# Patient Record
Sex: MALE | Race: WHITE
[De-identification: names, ages, dates, MRNs, and addresses within clinical notes are randomized per-mention and may not be internally consistent; named-entity substitution may affect disease eponyms.]

---

## 2018-01-31 ENCOUNTER — HOSPITAL ENCOUNTER (EMERGENCY)
Dept: HOSPITAL 31 - C.ER | Age: 43
Discharge: HOME | End: 2018-01-31
Payer: SELF-PAY

## 2018-01-31 VITALS
TEMPERATURE: 97.8 F | OXYGEN SATURATION: 98 % | HEART RATE: 71 BPM | DIASTOLIC BLOOD PRESSURE: 76 MMHG | SYSTOLIC BLOOD PRESSURE: 127 MMHG | RESPIRATION RATE: 20 BRPM

## 2018-01-31 DIAGNOSIS — R20.2: Primary | ICD-10-CM

## 2018-01-31 NOTE — C.PDOC
History Of Present Illness


41 y/o male presents to the ER complaining of a strange, burning sensation in 

his arms and legs which has been present for 3 weeks. Patient denies having 

weakness, parasthesia, and other complaints.Of note, patient works as a 

. He does high intensity exercises and works out 7 times a week. He 

takes multiple supplements which are not prescribed and contain hormones and 

stimulating precursors.


Time Seen by Provider: 01/31/18 10:27


Chief Complaint (Nursing): Upper Extremity Problem/Injury


History Per: Patient


History/Exam Limitations: no limitations


Onset/Duration Of Symptoms: Days


Current Symptoms Are (Timing): Still Present


Severity: Moderate





Past Medical History


Reviewed: Historical Data, Nursing Documentation, Vital Signs


Vital Signs: 


 Last Vital Signs











Temp  97.8 F   01/31/18 09:33


 


Pulse  71   01/31/18 09:33


 


Resp  20   01/31/18 09:33


 


BP  127/76   01/31/18 09:33


 


Pulse Ox  98   01/31/18 11:07














- Medical History


PMH: No Chronic Diseases


Surgical History: No Surg Hx


Family History: States: No Known Family Hx





- Social History


Hx Tobacco Use: No


Hx Alcohol Use: No


Hx Substance Use: No





- Immunization History


Hx Tetanus Toxoid Vaccination: No


Hx Influenza Vaccination: No


Hx Pneumococcal Vaccination: No





Review Of Systems


Neurological: Positive for: Other (burning sensation in arms and legs).  

Negative for: Weakness





Physical Exam





- Physical Exam


Appears: Non-toxic, No Acute Distress


Skin: Normal Color, Warm


Head: Atraumatic, Normacephalic


Eye(s): bilateral: Normal Inspection, PERRL


Nose: Normal


Oral Mucosa: Moist


Neck: Supple


Chest: Symmetrical


Cardiovascular: Rhythm Regular


Respiratory: Normal Breath Sounds, No Accessory Muscle Use, No Rales, No Rhonchi

, No Wheezing


Extremity: Normal ROM, No Tenderness, No Swelling


Neurological/Psych: Oriented x3, Normal Speech, Normal Cognition, Normal Motor, 

Normal Sensation





ED Course And Treatment


O2 Sat by Pulse Oximetry: 98 (RA)


Pulse Ox Interpretation: Normal





Medical Decision Making


Medical Decision Making: 





mild pins/tingling sensation is symmetrical all extremities with normal neuro 

exam, more likely related to  supplements


Asked to d/c supplements (stimulants and testosterone precursors) and re-eval 

in 2-3 weeks





outpatient MRI Brain/C-spine if s/s to not diminish- refer to Clinic. 





Disposition


Doctor Will See Patient In The: Office


Counseled Patient/Family Regarding: Studies Performed, Diagnosis





- Disposition


Referrals: 


[facility] [Outside]


Disposition: HOME/ ROUTINE


Disposition Time: 10:39


Condition: GOOD


Additional Instructions: 


stop all bodybuilding supplements- hormone and stimulant precursors- for 2 

weeks and re-eval tingling sensation





Follow-up in our outpatient Family Practice Clinic to consider Neuro referral 

and labs/MRI brain/C-Spine if s/s do not diminish as above.


Forms:  General Discharge Instructions, [facility] (English)





- Clinical Impression


Clinical Impression: 


 Tingling








- Scribe Statement


The provider has reviewed the documentation as recorded by the Dilloniblucy Watts


Provider Attestation: 





All medical record entries made by the Dillonibe were at my direction and 

personally dictated by me. I have reviewed the chart and agree that the record 

accurately reflects my personal performance of the history, physical exam, 

medical decision making, and the department course for this patient. I have 

also personally directed, reviewed, and agree with the discharge instructions 

and disposition.